# Patient Record
Sex: FEMALE | Race: WHITE
[De-identification: names, ages, dates, MRNs, and addresses within clinical notes are randomized per-mention and may not be internally consistent; named-entity substitution may affect disease eponyms.]

---

## 2021-12-02 ENCOUNTER — HOSPITAL ENCOUNTER (EMERGENCY)
Dept: HOSPITAL 35 - ER | Age: 36
Discharge: HOME | End: 2021-12-02
Payer: COMMERCIAL

## 2021-12-02 VITALS — DIASTOLIC BLOOD PRESSURE: 84 MMHG | SYSTOLIC BLOOD PRESSURE: 116 MMHG

## 2021-12-02 VITALS — WEIGHT: 120 LBS | BODY MASS INDEX: 22.08 KG/M2 | HEIGHT: 62 IN

## 2021-12-02 VITALS — HEIGHT: 62 IN | BODY MASS INDEX: 22.08 KG/M2 | WEIGHT: 120 LBS

## 2021-12-02 VITALS — DIASTOLIC BLOOD PRESSURE: 77 MMHG | SYSTOLIC BLOOD PRESSURE: 114 MMHG

## 2021-12-02 DIAGNOSIS — O20.0: Primary | ICD-10-CM

## 2021-12-02 DIAGNOSIS — Z3A.01: ICD-10-CM

## 2021-12-02 DIAGNOSIS — Z79.899: ICD-10-CM

## 2021-12-02 DIAGNOSIS — O03.9: Primary | ICD-10-CM

## 2021-12-02 LAB
ALBUMIN SERPL-MCNC: 3.5 G/DL (ref 3.4–5)
ALT SERPL-CCNC: 16 U/L (ref 14–59)
ANION GAP SERPL CALC-SCNC: 10 MMOL/L (ref 7–16)
AST SERPL-CCNC: 12 U/L (ref 15–37)
BILIRUB SERPL-MCNC: 0.3 MG/DL (ref 0.2–1)
BUN SERPL-MCNC: 9 MG/DL (ref 7–18)
CALCIUM SERPL-MCNC: 8.5 MG/DL (ref 8.5–10.1)
CHLORIDE SERPL-SCNC: 104 MMOL/L (ref 98–107)
CO2 SERPL-SCNC: 25 MMOL/L (ref 21–32)
CREAT SERPL-MCNC: 0.8 MG/DL (ref 0.6–1)
ERYTHROCYTE [DISTWIDTH] IN BLOOD BY AUTOMATED COUNT: 14.4 % (ref 10.5–14.5)
ERYTHROCYTE [DISTWIDTH] IN BLOOD BY AUTOMATED COUNT: 14.6 % (ref 10.5–14.5)
GLUCOSE SERPL-MCNC: 104 MG/DL (ref 74–106)
HCT VFR BLD CALC: 33.7 % (ref 37–47)
HCT VFR BLD CALC: 37.1 % (ref 37–47)
HGB BLD-MCNC: 11.5 GM/DL (ref 12–15)
HGB BLD-MCNC: 12.1 GM/DL (ref 12–15)
MCH RBC QN AUTO: 28.8 PG (ref 26–34)
MCH RBC QN AUTO: 29.7 PG (ref 26–34)
MCHC RBC AUTO-ENTMCNC: 32.6 G/DL (ref 28–37)
MCHC RBC AUTO-ENTMCNC: 34 G/DL (ref 28–37)
MCV RBC: 87.3 FL (ref 80–100)
MCV RBC: 88.4 FL (ref 80–100)
PLATELET # BLD: 276 THOU/UL (ref 150–400)
PLATELET # BLD: 291 THOU/UL (ref 150–400)
POTASSIUM SERPL-SCNC: 3.5 MMOL/L (ref 3.5–5.1)
PROT SERPL-MCNC: 6.8 G/DL (ref 6.4–8.2)
RBC # BLD AUTO: 3.86 MIL/UL (ref 4.2–5)
RBC # BLD AUTO: 4.2 MIL/UL (ref 4.2–5)
SODIUM SERPL-SCNC: 139 MMOL/L (ref 136–145)
WBC # BLD AUTO: 5.1 THOU/UL (ref 4–11)
WBC # BLD AUTO: 7.3 THOU/UL (ref 4–11)

## 2021-12-06 NOTE — PATH
[facility]
[address]
[place]   [address]                     PATHOLOGY RPT PROCEDURE       
_______________________________________________________________________________
 
Name:       AFTABARAVIND             Room #:                     DEP SCOTT WILLETT#:      [id number]     Account #:      [id number]  
Admission:  12/02/21    Date of Birth:  [birth year]  
Discharge:  12/02/21                                    Report #:    [id number]
                                                        Path Case #: [id number]
_______________________________________________________________________________
 
[facility] Accession Number: [id number]
.                                                                01
Material submitted:                                        .
product of conception - PRODUCT OF CONCEPTION
.                                                                02
**********************************************************************
Diagnosis:
Endometrium at first trimester chorionic villi (products of conception):
- Decidualized endometrium with necrosis, acute and chronic inflammation
are identified in conjunction with first trimester chorionic villi with
myxoid degenerative changes.
(SWK:hollie; 12/06/2021)
S  12/06/2021  1344 Local
**********************************************************************
.                                                                02
Comment:
Variable sized villi are identified which raise the possibility of partial
molar pregnancy.  Will be referred on for ploidy to further evaluate this.
(SWK:hollie; 12/06/2021)
.                                                                02
Electronically signed:                                     .
Spencer Wells Kerley, MD, Pathologist
NPI- [id number]
.                                                                01
Gross description:                                         .
The specimen is received in formalin, labeled "Aravind Garcia, products
of conception".  Received is a segment of pale tan to pink-gray tissue
with attached spongiform tissue measuring 3.8 x 2.1 x 1.0 cm in greatest
dimensions.  Fetal or embryonic tissue is not grossly identified.
Vesicular structures are absent.  The specimen is submitted
representatively in cassettes A1 through A3.
([facility]; 12/4/2021)
QAC/QAC  12/04/2021  1151 Local
.                                                                02
Pathologist provided ICD-10:
Z03.89
.                                                                02
CPT                                                        .
871690
Specimen Comment: A courtesy copy of this report has been sent to [phone]
Specimen Comment: Report sent to 
Specimen Comment: A duplicate report has been generated due to demographic
updates.
***Performed at:  01
   [facility], [place]  [id number]
   MD Pascual Albarran MD Phone:  [phone]
 
 
[place] [address]                     PATHOLOGY RPT PROCEDURE       
_______________________________________________________________________________
 
Name:       ARAVIND GARCIA             Room #:                     DEP SCOTT WILLETT#:      [id number]     Account #:      [id number]  
Admission:  12/02/21    Date of Birth:  [birth year]  
Discharge:  12/02/21                                    Report #:    [id number]
                                                        Path Case #: [id number]
_______________________________________________________________________________
***Performed at:  02
   [facility]  [id number]
   MD Spencer Kerley MD Phone:  [phone]